# Patient Record
Sex: MALE | Race: WHITE | NOT HISPANIC OR LATINO
[De-identification: names, ages, dates, MRNs, and addresses within clinical notes are randomized per-mention and may not be internally consistent; named-entity substitution may affect disease eponyms.]

---

## 2017-01-12 ENCOUNTER — APPOINTMENT (OUTPATIENT)
Dept: PULMONOLOGY | Facility: CLINIC | Age: 72
End: 2017-01-12

## 2017-01-12 VITALS
SYSTOLIC BLOOD PRESSURE: 130 MMHG | TEMPERATURE: 97.4 F | OXYGEN SATURATION: 91 % | BODY MASS INDEX: 32.38 KG/M2 | HEART RATE: 103 BPM | HEIGHT: 64.4 IN | DIASTOLIC BLOOD PRESSURE: 80 MMHG | WEIGHT: 192 LBS

## 2017-01-13 LAB
ALBUMIN SERPL ELPH-MCNC: 4.1 G/DL
ALP BLD-CCNC: 49 U/L
ALT SERPL-CCNC: 20 U/L
ANION GAP SERPL CALC-SCNC: 16 MMOL/L
AST SERPL-CCNC: 22 U/L
BASOPHILS # BLD AUTO: 0.03 K/UL
BASOPHILS NFR BLD AUTO: 0.4 %
BILIRUB SERPL-MCNC: 0.2 MG/DL
BUN SERPL-MCNC: 24 MG/DL
CALCIUM SERPL-MCNC: 9.5 MG/DL
CHLORIDE SERPL-SCNC: 101 MMOL/L
CHOLEST SERPL-MCNC: 177 MG/DL
CHOLEST/HDLC SERPL: 5.5 RATIO
CO2 SERPL-SCNC: 22 MMOL/L
CREAT SERPL-MCNC: 1.28 MG/DL
EOSINOPHIL # BLD AUTO: 0.38 K/UL
EOSINOPHIL NFR BLD AUTO: 4.5 %
GLUCOSE SERPL-MCNC: 198 MG/DL
HBA1C MFR BLD HPLC: 7.6 %
HCT VFR BLD CALC: 43.3 %
HDLC SERPL-MCNC: 32 MG/DL
HGB BLD-MCNC: 13.9 G/DL
IMM GRANULOCYTES NFR BLD AUTO: 0.6 %
LDLC SERPL CALC-MCNC: 88 MG/DL
LYMPHOCYTES # BLD AUTO: 2 K/UL
LYMPHOCYTES NFR BLD AUTO: 23.5 %
MAN DIFF?: NORMAL
MCHC RBC-ENTMCNC: 31 PG
MCHC RBC-ENTMCNC: 32.1 GM/DL
MCV RBC AUTO: 96.7 FL
MONOCYTES # BLD AUTO: 0.73 K/UL
MONOCYTES NFR BLD AUTO: 8.6 %
NEUTROPHILS # BLD AUTO: 5.32 K/UL
NEUTROPHILS NFR BLD AUTO: 62.4 %
PLATELET # BLD AUTO: 303 K/UL
POTASSIUM SERPL-SCNC: 5.2 MMOL/L
PROT SERPL-MCNC: 8.1 G/DL
RBC # BLD: 4.48 M/UL
RBC # FLD: 15.2 %
SODIUM SERPL-SCNC: 139 MMOL/L
TRIGL SERPL-MCNC: 286 MG/DL
WBC # FLD AUTO: 8.51 K/UL

## 2017-01-18 ENCOUNTER — RX RENEWAL (OUTPATIENT)
Age: 72
End: 2017-01-18

## 2017-04-03 ENCOUNTER — RX RENEWAL (OUTPATIENT)
Age: 72
End: 2017-04-03

## 2017-05-01 ENCOUNTER — APPOINTMENT (OUTPATIENT)
Dept: PULMONOLOGY | Facility: CLINIC | Age: 72
End: 2017-05-01

## 2017-05-01 VITALS
DIASTOLIC BLOOD PRESSURE: 104 MMHG | SYSTOLIC BLOOD PRESSURE: 144 MMHG | WEIGHT: 194 LBS | HEART RATE: 107 BPM | OXYGEN SATURATION: 91 % | BODY MASS INDEX: 32.89 KG/M2

## 2017-05-01 DIAGNOSIS — R04.2 HEMOPTYSIS: ICD-10-CM

## 2017-07-04 ENCOUNTER — RX RENEWAL (OUTPATIENT)
Age: 72
End: 2017-07-04

## 2017-09-14 ENCOUNTER — RX RENEWAL (OUTPATIENT)
Age: 72
End: 2017-09-14

## 2017-11-09 ENCOUNTER — RX RENEWAL (OUTPATIENT)
Age: 72
End: 2017-11-09

## 2017-12-13 ENCOUNTER — RX RENEWAL (OUTPATIENT)
Age: 72
End: 2017-12-13

## 2018-01-26 ENCOUNTER — RX RENEWAL (OUTPATIENT)
Age: 73
End: 2018-01-26

## 2018-03-19 ENCOUNTER — APPOINTMENT (OUTPATIENT)
Dept: PULMONOLOGY | Facility: CLINIC | Age: 73
End: 2018-03-19
Payer: MEDICARE

## 2018-03-19 VITALS
WEIGHT: 195 LBS | DIASTOLIC BLOOD PRESSURE: 98 MMHG | BODY MASS INDEX: 33.06 KG/M2 | HEART RATE: 94 BPM | OXYGEN SATURATION: 92 % | TEMPERATURE: 98.4 F | RESPIRATION RATE: 20 BRPM | SYSTOLIC BLOOD PRESSURE: 158 MMHG

## 2018-03-19 PROCEDURE — 99406 BEHAV CHNG SMOKING 3-10 MIN: CPT

## 2018-03-19 PROCEDURE — 99214 OFFICE O/P EST MOD 30 MIN: CPT | Mod: 25

## 2018-04-19 ENCOUNTER — APPOINTMENT (OUTPATIENT)
Dept: PULMONOLOGY | Facility: CLINIC | Age: 73
End: 2018-04-19
Payer: MEDICARE

## 2018-04-19 VITALS
SYSTOLIC BLOOD PRESSURE: 128 MMHG | TEMPERATURE: 97.6 F | OXYGEN SATURATION: 92 % | RESPIRATION RATE: 18 BRPM | WEIGHT: 194 LBS | DIASTOLIC BLOOD PRESSURE: 80 MMHG | HEART RATE: 111 BPM

## 2018-04-19 DIAGNOSIS — F17.200 NICOTINE DEPENDENCE, UNSPECIFIED, UNCOMPLICATED: ICD-10-CM

## 2018-04-19 PROCEDURE — 36415 COLL VENOUS BLD VENIPUNCTURE: CPT

## 2018-04-19 PROCEDURE — 99215 OFFICE O/P EST HI 40 MIN: CPT | Mod: 25

## 2018-04-19 PROCEDURE — 99406 BEHAV CHNG SMOKING 3-10 MIN: CPT

## 2018-04-23 LAB
ALBUMIN SERPL ELPH-MCNC: 4.2 G/DL
ALP BLD-CCNC: 42 U/L
ALT SERPL-CCNC: 19 U/L
ANION GAP SERPL CALC-SCNC: 15 MMOL/L
AST SERPL-CCNC: 25 U/L
BASOPHILS # BLD AUTO: 0.03 K/UL
BASOPHILS NFR BLD AUTO: 0.3 %
BILIRUB SERPL-MCNC: <0.2 MG/DL
BUN SERPL-MCNC: 17 MG/DL
CALCIUM SERPL-MCNC: 9.5 MG/DL
CHLORIDE SERPL-SCNC: 104 MMOL/L
CHOLEST SERPL-MCNC: 165 MG/DL
CHOLEST/HDLC SERPL: 7.5 RATIO
CO2 SERPL-SCNC: 22 MMOL/L
CREAT SERPL-MCNC: 1.39 MG/DL
EOSINOPHIL # BLD AUTO: 0.34 K/UL
EOSINOPHIL NFR BLD AUTO: 3.9 %
GLUCOSE SERPL-MCNC: 188 MG/DL
HBA1C MFR BLD HPLC: 7.5 %
HCT VFR BLD CALC: 41.3 %
HDLC SERPL-MCNC: 22 MG/DL
HGB BLD-MCNC: 13.8 G/DL
IMM GRANULOCYTES NFR BLD AUTO: 0.1 %
LDLC SERPL CALC-MCNC: NORMAL
LYMPHOCYTES # BLD AUTO: 1.59 K/UL
LYMPHOCYTES NFR BLD AUTO: 18.2 %
MAN DIFF?: NORMAL
MCHC RBC-ENTMCNC: 32.2 PG
MCHC RBC-ENTMCNC: 33.4 GM/DL
MCV RBC AUTO: 96.5 FL
MONOCYTES # BLD AUTO: 0.79 K/UL
MONOCYTES NFR BLD AUTO: 9.1 %
NEUTROPHILS # BLD AUTO: 5.96 K/UL
NEUTROPHILS NFR BLD AUTO: 68.4 %
PLATELET # BLD AUTO: 283 K/UL
POTASSIUM SERPL-SCNC: 5.1 MMOL/L
PROT SERPL-MCNC: 8.3 G/DL
RBC # BLD: 4.28 M/UL
RBC # FLD: 14.6 %
SODIUM SERPL-SCNC: 141 MMOL/L
TRIGL SERPL-MCNC: 412 MG/DL
WBC # FLD AUTO: 8.72 K/UL

## 2018-05-13 ENCOUNTER — RX RENEWAL (OUTPATIENT)
Age: 73
End: 2018-05-13

## 2018-06-12 ENCOUNTER — RX RENEWAL (OUTPATIENT)
Age: 73
End: 2018-06-12

## 2018-09-13 ENCOUNTER — RX RENEWAL (OUTPATIENT)
Age: 73
End: 2018-09-13

## 2018-09-17 ENCOUNTER — RX RENEWAL (OUTPATIENT)
Age: 73
End: 2018-09-17

## 2018-12-03 ENCOUNTER — APPOINTMENT (OUTPATIENT)
Dept: PULMONOLOGY | Facility: CLINIC | Age: 73
End: 2018-12-03
Payer: MEDICARE

## 2018-12-03 ENCOUNTER — LABORATORY RESULT (OUTPATIENT)
Age: 73
End: 2018-12-03

## 2018-12-03 VITALS
OXYGEN SATURATION: 90 % | SYSTOLIC BLOOD PRESSURE: 140 MMHG | TEMPERATURE: 98 F | DIASTOLIC BLOOD PRESSURE: 94 MMHG | BODY MASS INDEX: 30.85 KG/M2 | WEIGHT: 182 LBS | HEART RATE: 92 BPM | RESPIRATION RATE: 20 BRPM

## 2018-12-03 DIAGNOSIS — E11.9 TYPE 2 DIABETES MELLITUS W/OUT COMPLICATIONS: ICD-10-CM

## 2018-12-03 DIAGNOSIS — E78.1 PURE HYPERGLYCERIDEMIA: ICD-10-CM

## 2018-12-03 DIAGNOSIS — I10 ESSENTIAL (PRIMARY) HYPERTENSION: ICD-10-CM

## 2018-12-03 PROCEDURE — 94060 EVALUATION OF WHEEZING: CPT

## 2018-12-03 PROCEDURE — 94727 GAS DIL/WSHOT DETER LNG VOL: CPT

## 2018-12-03 PROCEDURE — 99215 OFFICE O/P EST HI 40 MIN: CPT | Mod: 25

## 2018-12-03 PROCEDURE — 36415 COLL VENOUS BLD VENIPUNCTURE: CPT

## 2018-12-03 PROCEDURE — 94729 DIFFUSING CAPACITY: CPT

## 2018-12-04 ENCOUNTER — RX RENEWAL (OUTPATIENT)
Age: 73
End: 2018-12-04

## 2018-12-04 LAB
25(OH)D3 SERPL-MCNC: 32.5 NG/ML
ALBUMIN SERPL ELPH-MCNC: 4.2 G/DL
ALP BLD-CCNC: 47 U/L
ALT SERPL-CCNC: 15 U/L
ANION GAP SERPL CALC-SCNC: 9 MMOL/L
APPEARANCE: CLEAR
AST SERPL-CCNC: 21 U/L
BASOPHILS # BLD AUTO: 0.04 K/UL
BASOPHILS NFR BLD AUTO: 0.4 %
BILIRUB SERPL-MCNC: <0.2 MG/DL
BILIRUBIN URINE: NEGATIVE
BLOOD URINE: NEGATIVE
BUN SERPL-MCNC: 19 MG/DL
CALCIUM SERPL-MCNC: 9.8 MG/DL
CHLORIDE SERPL-SCNC: 110 MMOL/L
CHOLEST SERPL-MCNC: 126 MG/DL
CHOLEST/HDLC SERPL: 5.3 RATIO
CO2 SERPL-SCNC: 22 MMOL/L
COLOR: YELLOW
CREAT SERPL-MCNC: 1.11 MG/DL
EOSINOPHIL # BLD AUTO: 0.36 K/UL
EOSINOPHIL NFR BLD AUTO: 3.5 %
GLUCOSE QUALITATIVE U: 250 MG/DL
GLUCOSE SERPL-MCNC: 119 MG/DL
HBA1C MFR BLD HPLC: 6.9 %
HCT VFR BLD CALC: 42 %
HCV AB SER QL: NONREACTIVE
HCV S/CO RATIO: 0.24 S/CO
HDLC SERPL-MCNC: 24 MG/DL
HGB BLD-MCNC: 13.4 G/DL
IMM GRANULOCYTES NFR BLD AUTO: 0.3 %
KETONES URINE: NEGATIVE
LDLC SERPL CALC-MCNC: 53 MG/DL
LEUKOCYTE ESTERASE URINE: NEGATIVE
LYMPHOCYTES # BLD AUTO: 2.35 K/UL
LYMPHOCYTES NFR BLD AUTO: 23 %
MAN DIFF?: NORMAL
MCHC RBC-ENTMCNC: 30.9 PG
MCHC RBC-ENTMCNC: 31.9 GM/DL
MCV RBC AUTO: 97 FL
MONOCYTES # BLD AUTO: 0.82 K/UL
MONOCYTES NFR BLD AUTO: 8 %
NEUTROPHILS # BLD AUTO: 6.62 K/UL
NEUTROPHILS NFR BLD AUTO: 64.8 %
NITRITE URINE: NEGATIVE
PH URINE: 5
PLATELET # BLD AUTO: 313 K/UL
POTASSIUM SERPL-SCNC: 4.5 MMOL/L
PROT SERPL-MCNC: 8.2 G/DL
PROTEIN URINE: ABNORMAL MG/DL
RBC # BLD: 4.33 M/UL
RBC # FLD: 14.4 %
SODIUM SERPL-SCNC: 141 MMOL/L
SPECIFIC GRAVITY URINE: 1.02
TRIGL SERPL-MCNC: 246 MG/DL
TSH SERPL-ACNC: 2.66 UIU/ML
UROBILINOGEN URINE: NEGATIVE MG/DL
VIT B12 SERPL-MCNC: 938 PG/ML
WBC # FLD AUTO: 10.22 K/UL

## 2019-01-03 ENCOUNTER — RX RENEWAL (OUTPATIENT)
Age: 74
End: 2019-01-03

## 2019-01-03 ENCOUNTER — APPOINTMENT (OUTPATIENT)
Dept: PULMONOLOGY | Facility: CLINIC | Age: 74
End: 2019-01-03
Payer: MEDICARE

## 2019-01-03 VITALS
SYSTOLIC BLOOD PRESSURE: 138 MMHG | HEART RATE: 101 BPM | DIASTOLIC BLOOD PRESSURE: 78 MMHG | RESPIRATION RATE: 18 BRPM | BODY MASS INDEX: 30.34 KG/M2 | WEIGHT: 179 LBS | OXYGEN SATURATION: 91 %

## 2019-01-03 DIAGNOSIS — J84.10 PULMONARY FIBROSIS, UNSPECIFIED: ICD-10-CM

## 2019-01-03 PROCEDURE — 99214 OFFICE O/P EST MOD 30 MIN: CPT

## 2019-01-03 RX ORDER — GLIMEPIRIDE 4 MG/1
TABLET ORAL
Refills: 0 | Status: ACTIVE | COMMUNITY

## 2019-01-03 RX ORDER — FENOFIBRATE 54 MG/1
54 TABLET ORAL
Refills: 0 | Status: ACTIVE | COMMUNITY

## 2019-01-29 ENCOUNTER — RX RENEWAL (OUTPATIENT)
Age: 74
End: 2019-01-29

## 2019-01-30 ENCOUNTER — RX RENEWAL (OUTPATIENT)
Age: 74
End: 2019-01-30

## 2019-04-02 ENCOUNTER — RX RENEWAL (OUTPATIENT)
Age: 74
End: 2019-04-02

## 2019-04-22 ENCOUNTER — RX RENEWAL (OUTPATIENT)
Age: 74
End: 2019-04-22

## 2019-04-23 ENCOUNTER — RX RENEWAL (OUTPATIENT)
Age: 74
End: 2019-04-23

## 2019-05-15 ENCOUNTER — APPOINTMENT (OUTPATIENT)
Dept: PULMONOLOGY | Facility: CLINIC | Age: 74
End: 2019-05-15
Payer: MEDICARE

## 2019-05-15 VITALS
SYSTOLIC BLOOD PRESSURE: 134 MMHG | WEIGHT: 174 LBS | HEART RATE: 96 BPM | RESPIRATION RATE: 20 BRPM | DIASTOLIC BLOOD PRESSURE: 84 MMHG | TEMPERATURE: 97.3 F | BODY MASS INDEX: 29.5 KG/M2 | OXYGEN SATURATION: 87 %

## 2019-05-15 DIAGNOSIS — E78.00 PURE HYPERCHOLESTEROLEMIA, UNSPECIFIED: ICD-10-CM

## 2019-05-15 PROCEDURE — 99214 OFFICE O/P EST MOD 30 MIN: CPT

## 2019-05-15 NOTE — REVIEW OF SYSTEMS
[Dyspnea] : dyspnea [Itch] : itching [Rash] : [unfilled] rash [Diabetes] : diabetes mellitus [Fever] : no fever [Fatigue] : no fatigue [Nasal Congestion] : no nasal congestion [Sinus Problems] : no sinus problems [Cough] : no cough [Hemoptysis] : no hemoptysis [Wheezing] : no wheezing [Chest Discomfort] : no chest discomfort [Hypertension] : no ~T hypertension [PND] : no PND [Palpitations] : no palpitations [Edema] : ~T edema was not present [Hay Fever] : no hay fever [Itchy Eyes] : no itching of ~T the eyes [Heartburn] : no heartburn [Dysphagia] : no dysphagia [Nausea] : no nausea [Vomiting] : no vomiting [Nocturia] : no nocturia [Frequency] : no change in urinary frequency [Urgency] : no feelings of urinary urgency [Dysuria] : no dysuria [Back Pain] : ~T no back pain [Myalgias] : no myalgias [Arthralgias] : no arthralgias [Anemia] : no anemia [Easy Bruising] : no ~M tendency for easy bruising [Clotting Disorder] : no clotting disorder [Headache] : no headache [Syncope] : no fainting [Depression] : no depression [Anxiety] : no anxiety [Thyroid Problem] : no thyroid problem [DVT] : no DVT [Difficulty Initiating Sleep] : no difficulty falling asleep [Difficulty Maintaining Sleep] : no difficulty maintaining sleep

## 2019-05-15 NOTE — HISTORY OF PRESENT ILLNESS
[FreeTextEntry1] : He came for a follow up today. He is feeling well. No cough, wheezing shortness of breath. On Anoro.\par Still smoking. About 1 1/2 packs per day. Has been monitoring FSBS daily. On amoxicillin by his dentist. Has dental work.

## 2019-05-15 NOTE — DISCUSSION/SUMMARY
[FreeTextEntry1] : He is a 74 year-old man, a current smoker, with DM, hypercholesterolemia and COPD with pulmonary fibrosis. \par \par His COPD is stable. He is to continue with Anoro and albuterol as needed. \par \par Pneumonia vaccination advised but he declined. \par \par Smoking cessation advised. He may need to enter a cessation program. \par \par He is going beck to [place] next week for two months. \par

## 2019-05-15 NOTE — PHYSICAL EXAM
[Normal Oropharynx] : normal oropharynx [General Appearance - In No Acute Distress] : no acute distress [Neck Appearance] : the appearance of the neck was normal [Neck Cervical Mass (___cm)] : no neck mass was observed [Heart Sounds] : normal S1 and S2 [Murmurs] : no murmurs present [Abdomen Tenderness] : non-tender [Bowel Sounds] : normal bowel sounds [Abdomen Soft] : soft [Abnormal Walk] : normal gait [Nail Clubbing] : no clubbing of the fingernails [Cyanosis, Localized] : no localized cyanosis [Skin Turgor] : normal skin turgor [No Focal Deficits] : no focal deficits [] : no rash [Oriented To Time, Place, And Person] : oriented to person, place, and time [Impaired Insight] : insight and judgment were intact [Auscultation Breath Sounds / Voice Sounds] : lungs were clear to auscultation bilaterally [Erythema] : no erythema of the pharynx

## 2019-05-15 NOTE — PROCEDURE
[FreeTextEntry1] : Pulmonary function testing performed December 3, 2018. There was no obstruction. Mild restriction was present. Diffusion was severely reduced. No significant change noted after inhalation of bronchodilator.\par \par A CT examination of the chest was performed on May 2, 2017. There was bilateral pulmonary fibrosis. There was some ground glass opacity. There was also some bronchiectasis and some honeycombing. This was felt to be similar to the prior examination performed in 2013.\par \par CT examination of the chest performed December 7, 2018. Interstitial infiltrates, consistent with fibrosis, noted. Mild ground glass present. Honeycombing present. No pulmonary nodules or masses. No pleural effusions.

## 2019-06-14 ENCOUNTER — RX RENEWAL (OUTPATIENT)
Age: 74
End: 2019-06-14

## 2019-07-01 ENCOUNTER — RX RENEWAL (OUTPATIENT)
Age: 74
End: 2019-07-01

## 2019-07-18 ENCOUNTER — RX RENEWAL (OUTPATIENT)
Age: 74
End: 2019-07-18

## 2019-10-09 ENCOUNTER — RX RENEWAL (OUTPATIENT)
Age: 74
End: 2019-10-09

## 2019-11-15 ENCOUNTER — APPOINTMENT (OUTPATIENT)
Dept: PULMONOLOGY | Facility: CLINIC | Age: 74
End: 2019-11-15
Payer: MEDICARE

## 2019-11-15 VITALS
DIASTOLIC BLOOD PRESSURE: 80 MMHG | SYSTOLIC BLOOD PRESSURE: 128 MMHG | OXYGEN SATURATION: 91 % | HEART RATE: 95 BPM | WEIGHT: 160 LBS | TEMPERATURE: 97 F | BODY MASS INDEX: 27.12 KG/M2 | RESPIRATION RATE: 17 BRPM

## 2019-11-15 DIAGNOSIS — J44.9 CHRONIC OBSTRUCTIVE PULMONARY DISEASE, UNSPECIFIED: ICD-10-CM

## 2019-11-15 DIAGNOSIS — Z72.0 TOBACCO USE: ICD-10-CM

## 2019-11-15 PROCEDURE — 99214 OFFICE O/P EST MOD 30 MIN: CPT | Mod: 25

## 2019-11-15 PROCEDURE — 99406 BEHAV CHNG SMOKING 3-10 MIN: CPT

## 2019-11-15 NOTE — DISCUSSION/SUMMARY
[FreeTextEntry1] : He is a 74 year-old man, a current smoker, with DM, hypercholesterolemia and COPD with pulmonary fibrosis. \par \par His COPD is stable. He is to continue with Anoro and albuterol as needed. \par \par [facility]neumonia [facility] influenza [facility]\par \par Smoking cessation advised. Between 5 and 10 minutes spent discussing smoking cessation. \par \par He is going beck to [place]. Follow up in six months.

## 2019-11-15 NOTE — HISTORY OF PRESENT ILLNESS
[FreeTextEntry1] : He came for a follow up today. He is feeling well. \par \par He denied any cough, wheezing shortness of breath. On Anoro.\par \par Still smoking. About 1 1/2 packs per day. \par \par Has arthritis in both legs. Sees Dr. Milton Strong for it. Has been getting PT. \par \par He is going back to [place] on 12/3/19. \par \par [facility].

## 2019-11-15 NOTE — PHYSICAL EXAM
[General Appearance - In No Acute Distress] : no acute distress [Normal Oropharynx] : normal oropharynx [Neck Cervical Mass (___cm)] : no neck mass was observed [Heart Sounds] : normal S1 and S2 [Murmurs] : no murmurs present [Auscultation Breath Sounds / Voice Sounds] : lungs were clear to auscultation bilaterally [Bowel Sounds] : normal bowel sounds [Abdomen Soft] : soft [Abdomen Tenderness] : non-tender [Abnormal Walk] : normal gait [Cyanosis, Localized] : no localized cyanosis [Nail Clubbing] : no clubbing of the fingernails [] : no rash [Skin Turgor] : normal skin turgor [No Focal Deficits] : no focal deficits [Impaired Insight] : insight and judgment were intact [Oriented To Time, Place, And Person] : oriented to person, place, and time [Erythema] : no erythema of the pharynx

## 2019-11-15 NOTE — REVIEW OF SYSTEMS
[Rash] : [unfilled] rash [Itch] : itching [Diabetes] : diabetes mellitus [Fever] : no fever [Fatigue] : no fatigue [Nasal Congestion] : no nasal congestion [Sinus Problems] : no sinus problems [Cough] : no cough [Hemoptysis] : no hemoptysis [Dyspnea] : no dyspnea [Wheezing] : no wheezing [Pleuritic Pain] : no pleuritic pain [PND] : no PND [Chest Discomfort] : no chest discomfort [Hypertension] : no ~T hypertension [Edema] : ~T edema was not present [Palpitations] : no palpitations [Hay Fever] : no hay fever [Itchy Eyes] : no itching of ~T the eyes [Heartburn] : no heartburn [Nausea] : no nausea [Dysphagia] : no dysphagia [Vomiting] : no vomiting [Frequency] : no change in urinary frequency [Nocturia] : no nocturia [Dysuria] : no dysuria [Urgency] : no feelings of urinary urgency [Back Pain] : ~T no back pain [Myalgias] : no myalgias [Arthralgias] : no arthralgias [Anemia] : no anemia [Clotting Disorder] : no clotting disorder [Easy Bruising] : no ~M tendency for easy bruising [Syncope] : no fainting [Headache] : no headache [Anxiety] : no anxiety [Depression] : no depression [Thyroid Problem] : no thyroid problem [DVT] : no DVT [Difficulty Initiating Sleep] : no difficulty falling asleep [Difficulty Maintaining Sleep] : no difficulty maintaining sleep

## 2019-12-27 ENCOUNTER — RX RENEWAL (OUTPATIENT)
Age: 74
End: 2019-12-27

## 2020-04-09 ENCOUNTER — RX RENEWAL (OUTPATIENT)
Age: 75
End: 2020-04-09

## 2020-06-16 ENCOUNTER — RX RENEWAL (OUTPATIENT)
Age: 75
End: 2020-06-16

## 2020-06-29 ENCOUNTER — RX RENEWAL (OUTPATIENT)
Age: 75
End: 2020-06-29

## 2020-08-29 ENCOUNTER — RX RENEWAL (OUTPATIENT)
Age: 75
End: 2020-08-29

## 2020-08-29 RX ORDER — UMECLIDINIUM BROMIDE AND VILANTEROL TRIFENATATE 62.5; 25 UG/1; UG/1
62.5-25 POWDER RESPIRATORY (INHALATION)
Qty: 60 | Refills: 2 | Status: ACTIVE | COMMUNITY
Start: 2018-04-19 | End: 1900-01-01

## 2020-09-10 ENCOUNTER — RX RENEWAL (OUTPATIENT)
Age: 75
End: 2020-09-10

## 2020-09-10 RX ORDER — LISINOPRIL 10 MG/1
10 TABLET ORAL
Qty: 90 | Refills: 0 | Status: ACTIVE | COMMUNITY
Start: 2018-12-03 | End: 1900-01-01

## 2020-09-10 RX ORDER — SIMVASTATIN 10 MG/1
10 TABLET, FILM COATED ORAL
Qty: 90 | Refills: 0 | Status: ACTIVE | COMMUNITY
Start: 2020-06-16 | End: 1900-01-01

## 2023-05-24 NOTE — REASON FOR VISIT
[Follow-Up] : a follow-up visit [COPD] : COPD Dutasteride Pregnancy And Lactation Text: This medication is absolutely contraindicated in women, especially during pregnancy and breast feeding. Feminization of male fetuses is possible if taking while pregnant.